# Patient Record
Sex: MALE | Race: OTHER | Employment: UNEMPLOYED | URBAN - METROPOLITAN AREA
[De-identification: names, ages, dates, MRNs, and addresses within clinical notes are randomized per-mention and may not be internally consistent; named-entity substitution may affect disease eponyms.]

---

## 2023-12-24 ENCOUNTER — HOSPITAL ENCOUNTER (INPATIENT)
Age: 43
LOS: 2 days | Discharge: HOME | DRG: 434 | End: 2023-12-27
Attending: EMERGENCY MEDICINE | Admitting: STUDENT IN AN ORGANIZED HEALTH CARE EDUCATION/TRAINING PROGRAM
Payer: COMMERCIAL

## 2023-12-24 ENCOUNTER — APPOINTMENT (OUTPATIENT)
Dept: CT IMAGING | Age: 43
DRG: 434 | End: 2023-12-24
Payer: COMMERCIAL

## 2023-12-24 ENCOUNTER — APPOINTMENT (OUTPATIENT)
Dept: GENERAL RADIOLOGY | Age: 43
DRG: 434 | End: 2023-12-24
Payer: COMMERCIAL

## 2023-12-24 DIAGNOSIS — K76.82 HEPATIC ENCEPHALOPATHY (HCC): ICD-10-CM

## 2023-12-24 DIAGNOSIS — R73.9 HYPERGLYCEMIA: Primary | ICD-10-CM

## 2023-12-24 DIAGNOSIS — R53.1 RIGHT SIDED WEAKNESS: ICD-10-CM

## 2023-12-24 DIAGNOSIS — R79.89 INCREASED AMMONIA LEVEL: ICD-10-CM

## 2023-12-24 DIAGNOSIS — R29.90 STROKE-LIKE SYMPTOMS: ICD-10-CM

## 2023-12-24 DIAGNOSIS — R79.89 TROPONIN LEVEL ELEVATED: ICD-10-CM

## 2023-12-24 LAB
ALBUMIN SERPL-MCNC: 3.2 GM/DL (ref 3.4–5)
ALP BLD-CCNC: 129 IU/L (ref 40–129)
ALT SERPL-CCNC: 21 U/L (ref 10–40)
AMMONIA: 207 UMOL/L (ref 16–60)
ANION GAP SERPL CALCULATED.3IONS-SCNC: 11 MMOL/L (ref 7–16)
APTT: 36.1 SECONDS (ref 25.1–37.1)
AST SERPL-CCNC: 47 IU/L (ref 15–37)
BACTERIA: NEGATIVE /HPF
BASE EXCESS MIXED: 2.3 (ref 0–3)
BASE EXCESS: ABNORMAL (ref 0–3)
BASOPHILS ABSOLUTE: 0.1 K/CU MM
BASOPHILS RELATIVE PERCENT: 1.3 % (ref 0–1)
BILIRUB SERPL-MCNC: 2.4 MG/DL (ref 0–1)
BILIRUBIN URINE: NEGATIVE MG/DL
BLOOD, URINE: ABNORMAL
BUN SERPL-MCNC: 21 MG/DL (ref 6–23)
CALCIUM SERPL-MCNC: 8.9 MG/DL (ref 8.3–10.6)
CAST TYPE: ABNORMAL /HPF
CHLORIDE BLD-SCNC: 97 MMOL/L (ref 99–110)
CHP ED QC CHECK: YES
CLARITY: CLEAR
CO2 CONTENT: 24.5 MMOL/L (ref 21–32)
CO2: 22 MMOL/L (ref 21–32)
COLOR: YELLOW
COMMENT UA: ABNORMAL
CREAT SERPL-MCNC: 1.2 MG/DL (ref 0.9–1.3)
CRYSTAL TYPE: NEGATIVE /HPF
DIFFERENTIAL TYPE: ABNORMAL
EOSINOPHILS ABSOLUTE: 0.1 K/CU MM
EOSINOPHILS RELATIVE PERCENT: 2 % (ref 0–3)
EPITHELIAL CELLS, UA: 2 /HPF
GFR SERPL CREATININE-BSD FRML MDRD: >60 ML/MIN/1.73M2
GLUCOSE BLD-MCNC: 191 MG/DL
GLUCOSE BLD-MCNC: 191 MG/DL (ref 70–99)
GLUCOSE BLD-MCNC: 402 MG/DL (ref 70–99)
GLUCOSE SERPL-MCNC: 529 MG/DL (ref 70–99)
GLUCOSE, URINE: >1000 MG/DL
HCO3 VENOUS: 23.7 MMOL/L (ref 22–29)
HCT VFR BLD CALC: 31.7 % (ref 42–52)
HEMOGLOBIN: 11.2 GM/DL (ref 13.5–18)
IMMATURE NEUTROPHIL %: 0.2 % (ref 0–0.43)
INR BLD: 1.4 INDEX
KETONES, URINE: NEGATIVE MG/DL
LACTATE: 2.1 MMOL/L (ref 0.5–1.9)
LEUKOCYTE ESTERASE, URINE: NEGATIVE
LIPASE: 75 IU/L (ref 13–60)
LYMPHOCYTES ABSOLUTE: 1 K/CU MM
LYMPHOCYTES RELATIVE PERCENT: 21.4 % (ref 24–44)
MCH RBC QN AUTO: 34.4 PG (ref 27–31)
MCHC RBC AUTO-ENTMCNC: 35.3 % (ref 32–36)
MCV RBC AUTO: 97.2 FL (ref 78–100)
MONOCYTES ABSOLUTE: 0.4 K/CU MM
MONOCYTES RELATIVE PERCENT: 9 % (ref 0–4)
NITRITE URINE, QUANTITATIVE: NEGATIVE
O2 SAT, VEN: 99.1 % (ref 50–70)
PCO2, VEN: 26.5 MMHG (ref 41–51)
PDW BLD-RTO: 12.8 % (ref 11.7–14.9)
PH VENOUS: 7.56 (ref 7.32–7.43)
PH, URINE: 6 (ref 5–8)
PLATELET # BLD: 65 K/CU MM (ref 140–440)
PMV BLD AUTO: 11.6 FL (ref 7.5–11.1)
PO2, VEN: 112.1 MMHG (ref 28–48)
POTASSIUM SERPL-SCNC: 4.5 MMOL/L (ref 3.5–5.1)
PRO-BNP: 50.85 PG/ML
PROTEIN UA: NEGATIVE MG/DL
PROTHROMBIN TIME: 18.1 SECONDS (ref 11.7–14.5)
RBC # BLD: 3.26 M/CU MM (ref 4.6–6.2)
RBC URINE: 8 /HPF (ref 0–3)
SEGMENTED NEUTROPHILS ABSOLUTE COUNT: 3 K/CU MM
SEGMENTED NEUTROPHILS RELATIVE PERCENT: 66.1 % (ref 36–66)
SODIUM BLD-SCNC: 130 MMOL/L (ref 135–145)
SOURCE, BLOOD GAS: ABNORMAL
SPECIFIC GRAVITY UA: 1.01 (ref 1–1.03)
TOTAL IMMATURE NEUTOROPHIL: 0.01 K/CU MM
TOTAL PROTEIN: 7.7 GM/DL (ref 6.4–8.2)
TROPONIN, HIGH SENSITIVITY: 48 NG/L (ref 0–22)
UROBILINOGEN, URINE: 1 MG/DL (ref 0.2–1)
WBC # BLD: 4.6 K/CU MM (ref 4–10.5)
WBC UA: <1 /HPF (ref 0–2)

## 2023-12-24 PROCEDURE — 96374 THER/PROPH/DIAG INJ IV PUSH: CPT

## 2023-12-24 PROCEDURE — 2580000003 HC RX 258: Performed by: EMERGENCY MEDICINE

## 2023-12-24 PROCEDURE — 82010 KETONE BODYS QUAN: CPT

## 2023-12-24 PROCEDURE — 85025 COMPLETE CBC W/AUTO DIFF WBC: CPT

## 2023-12-24 PROCEDURE — 83880 ASSAY OF NATRIURETIC PEPTIDE: CPT

## 2023-12-24 PROCEDURE — 83690 ASSAY OF LIPASE: CPT

## 2023-12-24 PROCEDURE — 6370000000 HC RX 637 (ALT 250 FOR IP): Performed by: EMERGENCY MEDICINE

## 2023-12-24 PROCEDURE — 82962 GLUCOSE BLOOD TEST: CPT

## 2023-12-24 PROCEDURE — 85610 PROTHROMBIN TIME: CPT

## 2023-12-24 PROCEDURE — 99285 EMERGENCY DEPT VISIT HI MDM: CPT

## 2023-12-24 PROCEDURE — 71045 X-RAY EXAM CHEST 1 VIEW: CPT

## 2023-12-24 PROCEDURE — 85730 THROMBOPLASTIN TIME PARTIAL: CPT

## 2023-12-24 PROCEDURE — 82140 ASSAY OF AMMONIA: CPT

## 2023-12-24 PROCEDURE — 81001 URINALYSIS AUTO W/SCOPE: CPT

## 2023-12-24 PROCEDURE — 96361 HYDRATE IV INFUSION ADD-ON: CPT

## 2023-12-24 PROCEDURE — 83605 ASSAY OF LACTIC ACID: CPT

## 2023-12-24 PROCEDURE — 80053 COMPREHEN METABOLIC PANEL: CPT

## 2023-12-24 PROCEDURE — 70498 CT ANGIOGRAPHY NECK: CPT

## 2023-12-24 PROCEDURE — 70450 CT HEAD/BRAIN W/O DYE: CPT

## 2023-12-24 PROCEDURE — 84484 ASSAY OF TROPONIN QUANT: CPT

## 2023-12-24 PROCEDURE — 93005 ELECTROCARDIOGRAM TRACING: CPT | Performed by: EMERGENCY MEDICINE

## 2023-12-24 PROCEDURE — 6360000004 HC RX CONTRAST MEDICATION: Performed by: EMERGENCY MEDICINE

## 2023-12-24 RX ORDER — 0.9 % SODIUM CHLORIDE 0.9 %
1000 INTRAVENOUS SOLUTION INTRAVENOUS ONCE
Status: COMPLETED | OUTPATIENT
Start: 2023-12-24 | End: 2023-12-24

## 2023-12-24 RX ORDER — LACTULOSE 10 G/15ML
20 SOLUTION ORAL ONCE
Status: COMPLETED | OUTPATIENT
Start: 2023-12-24 | End: 2023-12-24

## 2023-12-24 RX ADMIN — INSULIN HUMAN 10 UNITS: 100 INJECTION, SOLUTION PARENTERAL at 21:03

## 2023-12-24 RX ADMIN — IOPAMIDOL 100 ML: 755 INJECTION, SOLUTION INTRAVENOUS at 21:04

## 2023-12-24 RX ADMIN — LACTULOSE 20 G: 20 SOLUTION ORAL at 21:04

## 2023-12-24 RX ADMIN — SODIUM CHLORIDE 1000 ML: 9 INJECTION, SOLUTION INTRAVENOUS at 20:23

## 2023-12-24 RX ADMIN — SODIUM CHLORIDE 1000 ML: 9 INJECTION, SOLUTION INTRAVENOUS at 21:29

## 2023-12-25 ENCOUNTER — APPOINTMENT (OUTPATIENT)
Dept: CT IMAGING | Age: 43
DRG: 434 | End: 2023-12-25
Payer: COMMERCIAL

## 2023-12-25 ENCOUNTER — APPOINTMENT (OUTPATIENT)
Dept: MRI IMAGING | Age: 43
DRG: 434 | End: 2023-12-25
Payer: COMMERCIAL

## 2023-12-25 PROBLEM — G93.40 ENCEPHALOPATHY: Status: ACTIVE | Noted: 2023-12-25

## 2023-12-25 LAB
ACETAMINOPHEN LEVEL: <5 UG/ML (ref 15–30)
ALBUMIN SERPL-MCNC: 3.1 GM/DL (ref 3.4–5)
ALCOHOL SCREEN SERUM: <0.01 %WT/VOL
ALP BLD-CCNC: 109 IU/L (ref 40–128)
ALT SERPL-CCNC: 21 U/L (ref 10–40)
AMMONIA: 152 UMOL/L (ref 16–60)
ANION GAP SERPL CALCULATED.3IONS-SCNC: 12 MMOL/L (ref 7–16)
AST SERPL-CCNC: 35 IU/L (ref 15–37)
BASOPHILS ABSOLUTE: 0.1 K/CU MM
BASOPHILS RELATIVE PERCENT: 1.2 % (ref 0–1)
BILIRUB SERPL-MCNC: 2.1 MG/DL (ref 0–1)
BUN SERPL-MCNC: 18 MG/DL (ref 6–23)
CALCIUM SERPL-MCNC: 8.4 MG/DL (ref 8.3–10.6)
CHLORIDE BLD-SCNC: 103 MMOL/L (ref 99–110)
CHOLEST SERPL-MCNC: 126 MG/DL
CHP ED QC CHECK: NORMAL
CO2: 22 MMOL/L (ref 21–32)
CREAT SERPL-MCNC: 1.1 MG/DL (ref 0.9–1.3)
DIFFERENTIAL TYPE: ABNORMAL
EKG ATRIAL RATE: 83 BPM
EKG ATRIAL RATE: 88 BPM
EKG DIAGNOSIS: NORMAL
EKG DIAGNOSIS: NORMAL
EKG P AXIS: 37 DEGREES
EKG P AXIS: 43 DEGREES
EKG P-R INTERVAL: 118 MS
EKG P-R INTERVAL: 144 MS
EKG Q-T INTERVAL: 396 MS
EKG Q-T INTERVAL: 400 MS
EKG QRS DURATION: 94 MS
EKG QRS DURATION: 94 MS
EKG QTC CALCULATION (BAZETT): 465 MS
EKG QTC CALCULATION (BAZETT): 484 MS
EKG R AXIS: 11 DEGREES
EKG R AXIS: 12 DEGREES
EKG T AXIS: 24 DEGREES
EKG T AXIS: 30 DEGREES
EKG VENTRICULAR RATE: 83 BPM
EKG VENTRICULAR RATE: 88 BPM
EOSINOPHILS ABSOLUTE: 0.1 K/CU MM
EOSINOPHILS RELATIVE PERCENT: 2.9 % (ref 0–3)
ESTIMATED AVERAGE GLUCOSE: 128 MG/DL
FERRITIN: 135 NG/ML (ref 30–400)
GFR SERPL CREATININE-BSD FRML MDRD: >60 ML/MIN/1.73M2
GLUCOSE BLD-MCNC: 120 MG/DL (ref 70–99)
GLUCOSE BLD-MCNC: 154 MG/DL
GLUCOSE BLD-MCNC: 154 MG/DL (ref 70–99)
GLUCOSE BLD-MCNC: 160 MG/DL (ref 70–99)
GLUCOSE BLD-MCNC: 311 MG/DL (ref 70–99)
GLUCOSE BLD-MCNC: 381 MG/DL (ref 70–99)
GLUCOSE BLD-MCNC: 389 MG/DL (ref 70–99)
GLUCOSE SERPL-MCNC: 132 MG/DL (ref 70–99)
HAV IGM SERPL QL IA: NON REACTIVE
HBA1C MFR BLD: 6.1 % (ref 4.2–6.3)
HBV CORE IGM SERPL QL IA: NON REACTIVE
HBV SURFACE AG SERPL QL IA: NON REACTIVE
HCT VFR BLD CALC: 29.1 % (ref 42–52)
HCV AB SERPL QL IA: NON REACTIVE
HDLC SERPL-MCNC: 66 MG/DL
HEMOGLOBIN: 10.2 GM/DL (ref 13.5–18)
HIV 1+2 AB+HIV1P24 AG SERPLBLD IA.RAPID: NON REACTIVE
IMMATURE NEUTROPHIL %: 0.5 % (ref 0–0.43)
INR BLD: 1.6 INDEX
IRON: 96 UG/DL (ref 59–158)
LDLC SERPL CALC-MCNC: 44 MG/DL
LYMPHOCYTES ABSOLUTE: 1 K/CU MM
LYMPHOCYTES RELATIVE PERCENT: 24.1 % (ref 24–44)
MCH RBC QN AUTO: 34.8 PG (ref 27–31)
MCHC RBC AUTO-ENTMCNC: 35.1 % (ref 32–36)
MCV RBC AUTO: 99.3 FL (ref 78–100)
MONOCYTES ABSOLUTE: 0.3 K/CU MM
MONOCYTES RELATIVE PERCENT: 7.8 % (ref 0–4)
NUCLEATED RBC %: 0 %
PCT TRANSFERRIN: 54 % (ref 10–44)
PDW BLD-RTO: 12.7 % (ref 11.7–14.9)
PLATELET # BLD: 60 K/CU MM (ref 140–440)
PMV BLD AUTO: 10.6 FL (ref 7.5–11.1)
POTASSIUM SERPL-SCNC: 3.8 MMOL/L (ref 3.5–5.1)
PROTHROMBIN TIME: 18.8 SECONDS (ref 11.7–14.5)
RBC # BLD: 2.93 M/CU MM (ref 4.6–6.2)
SEGMENTED NEUTROPHILS ABSOLUTE COUNT: 2.6 K/CU MM
SEGMENTED NEUTROPHILS RELATIVE PERCENT: 63.5 % (ref 36–66)
SODIUM BLD-SCNC: 137 MMOL/L (ref 135–145)
T4 FREE SERPL-MCNC: 1.1 NG/DL (ref 0.9–1.8)
TOTAL IMMATURE NEUTOROPHIL: 0.02 K/CU MM
TOTAL IRON BINDING CAPACITY: 178 UG/DL (ref 250–450)
TOTAL NUCLEATED RBC: 0 K/CU MM
TOTAL PROTEIN: 6.6 GM/DL (ref 6.4–8.2)
TRIGL SERPL-MCNC: 82 MG/DL
TROPONIN, HIGH SENSITIVITY: 57 NG/L (ref 0–22)
TSH SERPL DL<=0.005 MIU/L-ACNC: 3.1 UIU/ML (ref 0.27–4.2)
UNSATURATED IRON BINDING CAPACITY: 82 UG/DL (ref 110–370)
WBC # BLD: 4.1 K/CU MM (ref 4–10.5)

## 2023-12-25 PROCEDURE — 93005 ELECTROCARDIOGRAM TRACING: CPT | Performed by: STUDENT IN AN ORGANIZED HEALTH CARE EDUCATION/TRAINING PROGRAM

## 2023-12-25 PROCEDURE — 2580000003 HC RX 258: Performed by: STUDENT IN AN ORGANIZED HEALTH CARE EDUCATION/TRAINING PROGRAM

## 2023-12-25 PROCEDURE — 83540 ASSAY OF IRON: CPT

## 2023-12-25 PROCEDURE — 82390 ASSAY OF CERULOPLASMIN: CPT

## 2023-12-25 PROCEDURE — 82140 ASSAY OF AMMONIA: CPT

## 2023-12-25 PROCEDURE — G0480 DRUG TEST DEF 1-7 CLASSES: HCPCS

## 2023-12-25 PROCEDURE — 6370000000 HC RX 637 (ALT 250 FOR IP): Performed by: STUDENT IN AN ORGANIZED HEALTH CARE EDUCATION/TRAINING PROGRAM

## 2023-12-25 PROCEDURE — G0378 HOSPITAL OBSERVATION PER HR: HCPCS

## 2023-12-25 PROCEDURE — 74177 CT ABD & PELVIS W/CONTRAST: CPT

## 2023-12-25 PROCEDURE — 84439 ASSAY OF FREE THYROXINE: CPT

## 2023-12-25 PROCEDURE — 83550 IRON BINDING TEST: CPT

## 2023-12-25 PROCEDURE — 85025 COMPLETE CBC W/AUTO DIFF WBC: CPT

## 2023-12-25 PROCEDURE — 97535 SELF CARE MNGMENT TRAINING: CPT

## 2023-12-25 PROCEDURE — 82728 ASSAY OF FERRITIN: CPT

## 2023-12-25 PROCEDURE — 6360000004 HC RX CONTRAST MEDICATION: Performed by: STUDENT IN AN ORGANIZED HEALTH CARE EDUCATION/TRAINING PROGRAM

## 2023-12-25 PROCEDURE — 83036 HEMOGLOBIN GLYCOSYLATED A1C: CPT

## 2023-12-25 PROCEDURE — 80053 COMPREHEN METABOLIC PANEL: CPT

## 2023-12-25 PROCEDURE — 85610 PROTHROMBIN TIME: CPT

## 2023-12-25 PROCEDURE — 97116 GAIT TRAINING THERAPY: CPT

## 2023-12-25 PROCEDURE — 70551 MRI BRAIN STEM W/O DYE: CPT

## 2023-12-25 PROCEDURE — 82103 ALPHA-1-ANTITRYPSIN TOTAL: CPT

## 2023-12-25 PROCEDURE — 93010 ELECTROCARDIOGRAM REPORT: CPT | Performed by: INTERNAL MEDICINE

## 2023-12-25 PROCEDURE — 80074 ACUTE HEPATITIS PANEL: CPT

## 2023-12-25 PROCEDURE — 84443 ASSAY THYROID STIM HORMONE: CPT

## 2023-12-25 PROCEDURE — 80061 LIPID PANEL: CPT

## 2023-12-25 PROCEDURE — 84484 ASSAY OF TROPONIN QUANT: CPT

## 2023-12-25 PROCEDURE — 86038 ANTINUCLEAR ANTIBODIES: CPT

## 2023-12-25 PROCEDURE — 36415 COLL VENOUS BLD VENIPUNCTURE: CPT

## 2023-12-25 PROCEDURE — 86039 ANTINUCLEAR ANTIBODIES (ANA): CPT

## 2023-12-25 PROCEDURE — 82962 GLUCOSE BLOOD TEST: CPT

## 2023-12-25 PROCEDURE — 97161 PT EVAL LOW COMPLEX 20 MIN: CPT

## 2023-12-25 PROCEDURE — 87389 HIV-1 AG W/HIV-1&-2 AB AG IA: CPT

## 2023-12-25 PROCEDURE — 1200000000 HC SEMI PRIVATE

## 2023-12-25 PROCEDURE — 97165 OT EVAL LOW COMPLEX 30 MIN: CPT

## 2023-12-25 PROCEDURE — 94761 N-INVAS EAR/PLS OXIMETRY MLT: CPT

## 2023-12-25 RX ORDER — HEPARIN SODIUM 5000 [USP'U]/ML
5000 INJECTION, SOLUTION INTRAVENOUS; SUBCUTANEOUS EVERY 8 HOURS SCHEDULED
Status: DISCONTINUED | OUTPATIENT
Start: 2023-12-25 | End: 2023-12-27 | Stop reason: HOSPADM

## 2023-12-25 RX ORDER — INSULIN GLARGINE 100 [IU]/ML
5 INJECTION, SOLUTION SUBCUTANEOUS NIGHTLY
Status: DISCONTINUED | OUTPATIENT
Start: 2023-12-25 | End: 2023-12-25

## 2023-12-25 RX ORDER — ONDANSETRON 2 MG/ML
4 INJECTION INTRAMUSCULAR; INTRAVENOUS EVERY 6 HOURS PRN
Status: DISCONTINUED | OUTPATIENT
Start: 2023-12-25 | End: 2023-12-25

## 2023-12-25 RX ORDER — ENOXAPARIN SODIUM 100 MG/ML
40 INJECTION SUBCUTANEOUS DAILY
Status: DISCONTINUED | OUTPATIENT
Start: 2023-12-25 | End: 2023-12-25

## 2023-12-25 RX ORDER — INSULIN LISPRO 100 [IU]/ML
0-8 INJECTION, SOLUTION INTRAVENOUS; SUBCUTANEOUS
Status: DISCONTINUED | OUTPATIENT
Start: 2023-12-25 | End: 2023-12-26

## 2023-12-25 RX ORDER — GLUCAGON 1 MG/ML
1 KIT INJECTION PRN
Status: DISCONTINUED | OUTPATIENT
Start: 2023-12-25 | End: 2023-12-27 | Stop reason: HOSPADM

## 2023-12-25 RX ORDER — DEXTROSE MONOHYDRATE 100 MG/ML
INJECTION, SOLUTION INTRAVENOUS CONTINUOUS PRN
Status: DISCONTINUED | OUTPATIENT
Start: 2023-12-25 | End: 2023-12-27 | Stop reason: HOSPADM

## 2023-12-25 RX ORDER — SODIUM CHLORIDE 0.9 % (FLUSH) 0.9 %
5-40 SYRINGE (ML) INJECTION PRN
Status: DISCONTINUED | OUTPATIENT
Start: 2023-12-25 | End: 2023-12-27 | Stop reason: HOSPADM

## 2023-12-25 RX ORDER — SODIUM CHLORIDE 9 MG/ML
INJECTION, SOLUTION INTRAVENOUS PRN
Status: DISCONTINUED | OUTPATIENT
Start: 2023-12-25 | End: 2023-12-27 | Stop reason: HOSPADM

## 2023-12-25 RX ORDER — INSULIN LISPRO 100 [IU]/ML
0-4 INJECTION, SOLUTION INTRAVENOUS; SUBCUTANEOUS NIGHTLY
Status: DISCONTINUED | OUTPATIENT
Start: 2023-12-25 | End: 2023-12-26

## 2023-12-25 RX ORDER — ASPIRIN 81 MG/1
81 TABLET, CHEWABLE ORAL DAILY
Status: DISCONTINUED | OUTPATIENT
Start: 2023-12-25 | End: 2023-12-27 | Stop reason: HOSPADM

## 2023-12-25 RX ORDER — LACTULOSE 20 G/30ML
20 SOLUTION ORAL 3 TIMES DAILY
Status: DISCONTINUED | OUTPATIENT
Start: 2023-12-25 | End: 2023-12-26 | Stop reason: SDUPTHER

## 2023-12-25 RX ORDER — SODIUM CHLORIDE 0.9 % (FLUSH) 0.9 %
5-40 SYRINGE (ML) INJECTION EVERY 12 HOURS SCHEDULED
Status: DISCONTINUED | OUTPATIENT
Start: 2023-12-25 | End: 2023-12-27 | Stop reason: HOSPADM

## 2023-12-25 RX ORDER — ONDANSETRON 4 MG/1
4 TABLET, ORALLY DISINTEGRATING ORAL EVERY 8 HOURS PRN
Status: DISCONTINUED | OUTPATIENT
Start: 2023-12-25 | End: 2023-12-25

## 2023-12-25 RX ADMIN — IOPAMIDOL 80 ML: 755 INJECTION, SOLUTION INTRAVENOUS at 09:59

## 2023-12-25 RX ADMIN — LACTULOSE 20 G: 20 SOLUTION ORAL at 08:35

## 2023-12-25 RX ADMIN — ASPIRIN 81 MG: 81 TABLET, CHEWABLE ORAL at 08:35

## 2023-12-25 RX ADMIN — RIFAXIMIN 400 MG: 200 TABLET ORAL at 05:03

## 2023-12-25 RX ADMIN — SODIUM CHLORIDE, PRESERVATIVE FREE 10 ML: 5 INJECTION INTRAVENOUS at 08:36

## 2023-12-25 RX ADMIN — LACTULOSE 20 G: 20 SOLUTION ORAL at 12:46

## 2023-12-25 RX ADMIN — RIFAXIMIN 400 MG: 200 TABLET ORAL at 22:36

## 2023-12-25 RX ADMIN — LACTULOSE 20 G: 20 SOLUTION ORAL at 05:03

## 2023-12-25 RX ADMIN — RIFAXIMIN 400 MG: 200 TABLET ORAL at 12:46

## 2023-12-25 RX ADMIN — RIFAXIMIN 400 MG: 200 TABLET ORAL at 08:35

## 2023-12-25 RX ADMIN — LACTULOSE 20 G: 20 SOLUTION ORAL at 21:09

## 2023-12-25 RX ADMIN — SODIUM CHLORIDE, PRESERVATIVE FREE 10 ML: 5 INJECTION INTRAVENOUS at 21:08

## 2023-12-25 RX ADMIN — INSULIN LISPRO 4 UNITS: 100 INJECTION, SOLUTION INTRAVENOUS; SUBCUTANEOUS at 21:09

## 2023-12-25 RX ADMIN — INSULIN LISPRO 8 UNITS: 100 INJECTION, SOLUTION INTRAVENOUS; SUBCUTANEOUS at 17:12

## 2023-12-25 RX ADMIN — INSULIN LISPRO 6 UNITS: 100 INJECTION, SOLUTION INTRAVENOUS; SUBCUTANEOUS at 12:46

## 2023-12-25 NOTE — H&P
History and Physical      Name:  Sharri Gonzalez /Age/Sex: 1980  (37 y.o. male)   MRN & CSN:  [id number] & [id number] Encounter Date/Time: 2023 3:17 AM EST   Location:  [address] [place] [address] PCP: No primary care provider on file. Assessment and Plan:   Sharri Gonzalez is a 37 y.o. male with T2DM, prior CVA?, Liver cirrhosis presented as a transfer from [facility] ER due to R. UE weakness and confusion. Acute Hepatic Encephalopathy   Decompensated Liver Cirrhosis, unspecified etiology  Grade 2 HPE  Reportedly diagnosed in May, 2023. Denies alcohol use/tylenol/hepatitis   MELD score 14  Ammonia 207>152  Lactulose and rifaximin  CT abdomen and pelvis with IV contrast  Cirrhosis workup including: Hepatitis panel ethanol Tylenol level DARYL ceruloplasmin alpha 1 antitrypsin HIV screen. Acute on chronic RLE weakness  Rule out acute CVA  History of L. Frontal craniectomy but unable to tell me specifics  LKN 2023, NIH 10 on arrival.  Stroke alert initiated in ER, not a candidate for TNK due to timeline and likely metabolic etiology. CT brain with no acute intracranial findings, large left craniectomy and encephalomalacia in left frontal lobe. CTA head and neck without any significant LVO.   Start aspirin  Holding Lipitor due to mild elevation in LFTs  NIHSS and neurochecks   MRI brain for further evaluation and management  PT OT    T2DM with hyperglycemia  Apparently on insulin at home, unable to tell me specific dosage etc  Medium dose insulin sliding scale  Hypoglycemia protocol  Check A1c    Elevated troponin, non-ACS trend  EKG nonischemic and chest pain-free  48> 57  Recheck EKG and troponin  Aspirin    Chronic anemia  Thrombocytopenia  Likely secondary to liver cirrhosis  No evidence of active bleeding  Monitor CBC    Obesity/BMI 32.72    Inpatient, MedSur  Full code    Disposition:   Current Living situation: Home  Expected Disposition: Home  Estimated D/C: 2-3 days    Diet ADULT DIET; Easy to I3 Precision

## 2023-12-25 NOTE — CONSULTS
[facility], 1980, 1107/1107-A, 12/25/2023    History  [place]:  The primary encounter diagnosis was Hyperglycemia. Diagnoses of Increased ammonia level, Right sided weakness, Stroke-like symptoms, Troponin level elevated, and Hepatic encephalopathy ([address]) were also pertinent to this visit. Patient  has a past medical history of Cirrhosis of liver ([address]) and Diabetes mellitus ([address]). Patient  has no past surgical history on file. Discharge Recommendation: home with assistance and OPPT    Utilization of Interpreting services: [employer]  ID #:[id number]    Subjective:    Patient states: \"I can walk pretty good. \"      Pain:  denies. Communication with other providers:  Handoff to RN, co-eval with OT    Restrictions: General Precautions, Fall Risk    Home Setup/Prior level of function  Social/Functional History  Lives With: Spouse  Type of Home: House  Home Layout: One level  Home Access: Level entry, Stairs to enter without rails  Entrance Stairs - Number of Steps: 3  Bathroom Shower/Tub: Walk-in shower  Bathroom Toilet: Standard  Has the patient had two or more falls in the past year or any fall with injury in the past year?: Yes  ADL Assistance: Independent  Homemaking Assistance: Independent  Homemaking Responsibilities: No (reports being able to perform but is just \"lazy\" per brother report)  Ambulation Assistance: Independent  Transfer Assistance: Independent  Active : Yes    Examination of body systems (includes body structures/functions, activity/participation limitations):  Observation:  supine in bed with brother present upon arrival and agreeable to therapy  Vision:  [facility]  Hearing:  [place]  Cardiopulmonary:  on room air  Cognition: WFL, see OT/SLP note for further evaluation. Musculoskeletal  ROM R/L:  WFL. Strength R/L:  gross 4+/5 in LLE, gross 4-/5 in RLE, minimal impairment in function and endurance.       Mobility:  Rolling L/R: Please notify pt of that biopsy was c/w pap and is only low grade changes.  Needs pap in 1 year

## 2023-12-25 NOTE — ED PROVIDER NOTES
head and neck is negative. Sugars coming down with IV fluids and insulin. CLINICAL IMPRESSION:  Final diagnoses:   Hyperglycemia   Increased ammonia level   Right sided weakness   Stroke-like symptoms   Troponin level elevated   Hepatic encephalopathy (HCC)       (Please note that portions of this note may have been completed with a voice recognition program. Efforts were made to edit the dictations but occasionally words aremis-transcribed.)    DISPOSITION REFERRAL (if applicable):  No follow-up provider specified.     DISPOSITION MEDICATIONS (if applicable):  New Prescriptions    No medications on file          Roger Isaac, Yariel Paula  12/25/23 0030

## 2023-12-26 LAB
GLUCOSE BLD-MCNC: 209 MG/DL (ref 70–99)
GLUCOSE BLD-MCNC: 220 MG/DL (ref 70–99)
GLUCOSE BLD-MCNC: 261 MG/DL (ref 70–99)
GLUCOSE BLD-MCNC: 283 MG/DL (ref 70–99)
GLUCOSE BLD-MCNC: 286 MG/DL (ref 70–99)
GLUCOSE BLD-MCNC: 365 MG/DL (ref 70–99)
GLUCOSE BLD-MCNC: 389 MG/DL (ref 70–99)
GLUCOSE BLD-MCNC: 426 MG/DL (ref 70–99)
GLUCOSE BLD-MCNC: 435 MG/DL (ref 70–99)

## 2023-12-26 PROCEDURE — 6370000000 HC RX 637 (ALT 250 FOR IP): Performed by: INTERNAL MEDICINE

## 2023-12-26 PROCEDURE — 82962 GLUCOSE BLOOD TEST: CPT

## 2023-12-26 PROCEDURE — 6370000000 HC RX 637 (ALT 250 FOR IP): Performed by: STUDENT IN AN ORGANIZED HEALTH CARE EDUCATION/TRAINING PROGRAM

## 2023-12-26 PROCEDURE — 2580000003 HC RX 258: Performed by: STUDENT IN AN ORGANIZED HEALTH CARE EDUCATION/TRAINING PROGRAM

## 2023-12-26 PROCEDURE — 94761 N-INVAS EAR/PLS OXIMETRY MLT: CPT

## 2023-12-26 PROCEDURE — 51798 US URINE CAPACITY MEASURE: CPT

## 2023-12-26 PROCEDURE — 1200000000 HC SEMI PRIVATE

## 2023-12-26 RX ORDER — INSULIN LISPRO 100 [IU]/ML
0-16 INJECTION, SOLUTION INTRAVENOUS; SUBCUTANEOUS
Status: DISCONTINUED | OUTPATIENT
Start: 2023-12-26 | End: 2023-12-26

## 2023-12-26 RX ORDER — INSULIN LISPRO 100 [IU]/ML
0-8 INJECTION, SOLUTION INTRAVENOUS; SUBCUTANEOUS
Status: DISCONTINUED | OUTPATIENT
Start: 2023-12-26 | End: 2023-12-27 | Stop reason: HOSPADM

## 2023-12-26 RX ORDER — INSULIN LISPRO 100 [IU]/ML
15 INJECTION, SOLUTION INTRAVENOUS; SUBCUTANEOUS
Status: DISCONTINUED | OUTPATIENT
Start: 2023-12-26 | End: 2023-12-27 | Stop reason: HOSPADM

## 2023-12-26 RX ORDER — INSULIN LISPRO 100 [IU]/ML
5 INJECTION, SOLUTION INTRAVENOUS; SUBCUTANEOUS ONCE
Status: DISCONTINUED | OUTPATIENT
Start: 2023-12-26 | End: 2023-12-26

## 2023-12-26 RX ORDER — INSULIN GLARGINE 100 [IU]/ML
30 INJECTION, SOLUTION SUBCUTANEOUS NIGHTLY
Status: DISCONTINUED | OUTPATIENT
Start: 2023-12-26 | End: 2023-12-27

## 2023-12-26 RX ORDER — LACTULOSE 10 G/15ML
20 SOLUTION ORAL ONCE
Status: COMPLETED | OUTPATIENT
Start: 2023-12-26 | End: 2023-12-26

## 2023-12-26 RX ORDER — LACTULOSE 10 G/15ML
20 SOLUTION ORAL 3 TIMES DAILY
Status: DISCONTINUED | OUTPATIENT
Start: 2023-12-26 | End: 2023-12-27 | Stop reason: HOSPADM

## 2023-12-26 RX ORDER — INSULIN LISPRO 100 [IU]/ML
0-8 INJECTION, SOLUTION INTRAVENOUS; SUBCUTANEOUS
Status: DISCONTINUED | OUTPATIENT
Start: 2023-12-26 | End: 2023-12-26

## 2023-12-26 RX ADMIN — INSULIN LISPRO 4 UNITS: 100 INJECTION, SOLUTION INTRAVENOUS; SUBCUTANEOUS at 08:37

## 2023-12-26 RX ADMIN — INSULIN LISPRO 2 UNITS: 100 INJECTION, SOLUTION INTRAVENOUS; SUBCUTANEOUS at 16:18

## 2023-12-26 RX ADMIN — LACTULOSE 20 G: 20 SOLUTION ORAL at 19:58

## 2023-12-26 RX ADMIN — INSULIN LISPRO 2 UNITS: 100 INJECTION, SOLUTION INTRAVENOUS; SUBCUTANEOUS at 22:13

## 2023-12-26 RX ADMIN — LACTULOSE 20 G: 20 SOLUTION ORAL at 22:13

## 2023-12-26 RX ADMIN — RIFAXIMIN 400 MG: 200 TABLET ORAL at 08:37

## 2023-12-26 RX ADMIN — INSULIN GLARGINE 30 UNITS: 100 INJECTION, SOLUTION SUBCUTANEOUS at 22:12

## 2023-12-26 RX ADMIN — RIFAXIMIN 400 MG: 200 TABLET ORAL at 22:13

## 2023-12-26 RX ADMIN — INSULIN LISPRO 15 UNITS: 100 INJECTION, SOLUTION INTRAVENOUS; SUBCUTANEOUS at 12:28

## 2023-12-26 RX ADMIN — LACTULOSE 20 G: 20 SOLUTION ORAL at 14:20

## 2023-12-26 RX ADMIN — SODIUM CHLORIDE, PRESERVATIVE FREE 10 ML: 5 INJECTION INTRAVENOUS at 22:13

## 2023-12-26 RX ADMIN — INSULIN HUMAN 10 UNITS: 100 INJECTION, SUSPENSION SUBCUTANEOUS at 11:12

## 2023-12-26 RX ADMIN — RIFAXIMIN 400 MG: 200 TABLET ORAL at 14:20

## 2023-12-26 RX ADMIN — SODIUM CHLORIDE, PRESERVATIVE FREE 10 ML: 5 INJECTION INTRAVENOUS at 08:39

## 2023-12-26 RX ADMIN — LACTULOSE 20 G: 20 SOLUTION ORAL at 08:37

## 2023-12-26 RX ADMIN — INSULIN LISPRO 15 UNITS: 100 INJECTION, SOLUTION INTRAVENOUS; SUBCUTANEOUS at 16:18

## 2023-12-26 RX ADMIN — INSULIN LISPRO 8 UNITS: 100 INJECTION, SOLUTION INTRAVENOUS; SUBCUTANEOUS at 12:29

## 2023-12-26 RX ADMIN — ASPIRIN 81 MG: 81 TABLET, CHEWABLE ORAL at 08:37

## 2023-12-27 VITALS
TEMPERATURE: 97.6 F | HEART RATE: 104 BPM | OXYGEN SATURATION: 94 % | HEIGHT: 61 IN | DIASTOLIC BLOOD PRESSURE: 70 MMHG | SYSTOLIC BLOOD PRESSURE: 123 MMHG | BODY MASS INDEX: 32.72 KG/M2 | WEIGHT: 173.28 LBS | RESPIRATION RATE: 17 BRPM

## 2023-12-27 LAB
A1AT SERPL-MCNC: 120 MG/DL (ref 90–200)
CERULOPLASMIN SERPL-MCNC: 18 MG/DL (ref 15–30)
GLUCOSE BLD-MCNC: 247 MG/DL (ref 70–99)
GLUCOSE BLD-MCNC: 256 MG/DL (ref 70–99)
GLUCOSE BLD-MCNC: 327 MG/DL (ref 70–99)
GLUCOSE BLD-MCNC: 336 MG/DL (ref 70–99)

## 2023-12-27 PROCEDURE — 2580000003 HC RX 258: Performed by: STUDENT IN AN ORGANIZED HEALTH CARE EDUCATION/TRAINING PROGRAM

## 2023-12-27 PROCEDURE — 82962 GLUCOSE BLOOD TEST: CPT

## 2023-12-27 PROCEDURE — 6370000000 HC RX 637 (ALT 250 FOR IP): Performed by: STUDENT IN AN ORGANIZED HEALTH CARE EDUCATION/TRAINING PROGRAM

## 2023-12-27 PROCEDURE — 94761 N-INVAS EAR/PLS OXIMETRY MLT: CPT

## 2023-12-27 PROCEDURE — 6370000000 HC RX 637 (ALT 250 FOR IP): Performed by: INTERNAL MEDICINE

## 2023-12-27 RX ORDER — INSULIN LISPRO 100 [IU]/ML
15 INJECTION, SOLUTION INTRAVENOUS; SUBCUTANEOUS
Qty: 10 ML | Refills: 0 | Status: SHIPPED | OUTPATIENT
Start: 2023-12-27

## 2023-12-27 RX ORDER — BLOOD-GLUCOSE METER
1 KIT MISCELLANEOUS DAILY
Qty: 1 KIT | Refills: 0 | Status: SHIPPED | OUTPATIENT
Start: 2023-12-27

## 2023-12-27 RX ORDER — INSULIN LISPRO 100 [IU]/ML
0-8 INJECTION, SOLUTION INTRAVENOUS; SUBCUTANEOUS
Qty: 10 ML | Refills: 0 | Status: SHIPPED | OUTPATIENT
Start: 2023-12-27

## 2023-12-27 RX ORDER — ASPIRIN 81 MG/1
81 TABLET, CHEWABLE ORAL DAILY
Qty: 30 TABLET | Refills: 3 | Status: SHIPPED | OUTPATIENT
Start: 2023-12-28

## 2023-12-27 RX ORDER — INSULIN GLARGINE 100 [IU]/ML
40 INJECTION, SOLUTION SUBCUTANEOUS NIGHTLY
Qty: 10 ML | Refills: 3 | Status: SHIPPED | OUTPATIENT
Start: 2023-12-27

## 2023-12-27 RX ORDER — INSULIN GLARGINE 100 [IU]/ML
40 INJECTION, SOLUTION SUBCUTANEOUS NIGHTLY
Status: DISCONTINUED | OUTPATIENT
Start: 2023-12-27 | End: 2023-12-27 | Stop reason: HOSPADM

## 2023-12-27 RX ORDER — LACTULOSE 10 G/15ML
20 SOLUTION ORAL 3 TIMES DAILY
Qty: 946 ML | Refills: 1 | Status: SHIPPED | OUTPATIENT
Start: 2023-12-27

## 2023-12-27 RX ADMIN — INSULIN LISPRO 4 UNITS: 100 INJECTION, SOLUTION INTRAVENOUS; SUBCUTANEOUS at 08:47

## 2023-12-27 RX ADMIN — INSULIN LISPRO 6 UNITS: 100 INJECTION, SOLUTION INTRAVENOUS; SUBCUTANEOUS at 11:29

## 2023-12-27 RX ADMIN — LACTULOSE 20 G: 20 SOLUTION ORAL at 08:46

## 2023-12-27 RX ADMIN — INSULIN LISPRO 2 UNITS: 100 INJECTION, SOLUTION INTRAVENOUS; SUBCUTANEOUS at 04:16

## 2023-12-27 RX ADMIN — RIFAXIMIN 400 MG: 200 TABLET ORAL at 08:47

## 2023-12-27 RX ADMIN — INSULIN LISPRO 15 UNITS: 100 INJECTION, SOLUTION INTRAVENOUS; SUBCUTANEOUS at 11:29

## 2023-12-27 RX ADMIN — INSULIN LISPRO 15 UNITS: 100 INJECTION, SOLUTION INTRAVENOUS; SUBCUTANEOUS at 17:05

## 2023-12-27 RX ADMIN — ASPIRIN 81 MG: 81 TABLET, CHEWABLE ORAL at 08:47

## 2023-12-27 RX ADMIN — INSULIN LISPRO 6 UNITS: 100 INJECTION, SOLUTION INTRAVENOUS; SUBCUTANEOUS at 17:04

## 2023-12-27 RX ADMIN — LACTULOSE 20 G: 20 SOLUTION ORAL at 14:45

## 2023-12-27 RX ADMIN — INSULIN LISPRO 15 UNITS: 100 INJECTION, SOLUTION INTRAVENOUS; SUBCUTANEOUS at 08:48

## 2023-12-27 RX ADMIN — SODIUM CHLORIDE, PRESERVATIVE FREE 10 ML: 5 INJECTION INTRAVENOUS at 08:47

## 2023-12-27 RX ADMIN — RIFAXIMIN 400 MG: 200 TABLET ORAL at 14:45

## 2023-12-27 NOTE — DISCHARGE SUMMARY
weight based adjustment of the mA/kV was utilized to reduce the radiation dose to as low as reasonably achievable. COMPARISON: None. HISTORY: Reason for Exam: Right side weakness FINDINGS: CTA NECK: AORTIC ARCH/ARCH VESSELS: No dissection or arterial injury. No significant stenosis of the brachiocephalic or subclavian arteries. CAROTID ARTERIES: No dissection, arterial injury, or hemodynamically significant stenosis by NASCET criteria. VERTEBRAL ARTERIES: No dissection, arterial injury, or significant stenosis. SOFT TISSUES: The lung apices are clear. No cervical or superior mediastinal lymphadenopathy. The larynx and pharynx are unremarkable. No acute abnormality of the salivary and thyroid glands. BONES: No acute osseous abnormality. CTA HEAD: ANTERIOR CIRCULATION: No significant stenosis of the intracranial internal carotid, anterior cerebral, or middle cerebral arteries. No aneurysm. POSTERIOR CIRCULATION: No significant stenosis of the vertebral, basilar, or posterior cerebral arteries. No aneurysm. OTHER: No dural venous sinus thrombosis on this non-dedicated study. BRAIN: No mass effect or midline shift. No extra-axial fluid collection. The gray-white differentiation is maintained. No flow-limiting large vessel stenosis in the head and neck. CT HEAD WO CONTRAST    Addendum Date: 12/24/2023    ADDENDUM: Findings were communicated to Dr. Johnny Mckeon by the CORE team on 12/24/2023 at 9:23 pm.     Result Date: 12/24/2023  EXAMINATION: CT OF THE HEAD WITHOUT CONTRAST  12/24/2023 7:57 pm TECHNIQUE: CT of the head was performed without the administration of intravenous contrast. Automated exposure control, iterative reconstruction, and/or weight based adjustment of the mA/kV was utilized to reduce the radiation dose to as low as reasonably achievable. COMPARISON: None.  HISTORY: ORDERING SYSTEM PROVIDED HISTORY: HEAD TRAUMA, CLOSED, MILD, GCS >= 13, NO RISK FACTORS, NEURO EXAM NORMAL TECHNOLOGIST PROVIDED

## 2023-12-27 NOTE — CARE COORDINATION
From home with wife, and two adult children. ind, has pcp, dr Terrence Mason, insurance, and wife denies needs. Will follow. [facility]   12/27/23 4723   Service Assessment   Patient Orientation Unable to Assess   Cognition Other (see comment)   History Provided By [facility]   Primary Caregiver Self   Support Systems Spouse/Significant Other   Patient's Healthcare Decision Maker is: Legal Next of Kin   PCP Verified by CM Yes   Last Visit to PCP Within last 3 months   Prior Functional Level Independent in ADLs/IADLs   Current Functional Level Independent in ADLs/IADLs   Can patient return to prior living arrangement Yes   Ability to make needs known: Good   Family able to assist with home care needs: No   Would you like for me to discuss the discharge plan with any other family members/significant others, and if so, who? Yes   Community Resources None   Social/Functional History   Lives With Spouse   Type of [address] [place] [address] Center  One level   Home Access Level entry;Stairs to enter without rails   Bathroom Shower/Tub Walk-in shower   [facility] Responsibilities No   Ambulation Assistance Independent   Transfer Assistance Independent   Active  Yes   Discharge [address] Prior To Admission None   Potential Assistance Needed N/A   DME Ordered? No   Potential Assistance Purchasing Medications No   Type of Home Care Services None   Services At/After Discharge   Transition of Care Consult (CM Consult) [facility] Discharge None   [place] Resource Information Provided?  No
<-- Click to add NO pertinent Past Medical History

## 2023-12-28 LAB — NUCLEAR IGG SER QL IA: DETECTED

## 2023-12-28 NOTE — PROGRESS NOTES
4 Eyes Skin Assessment     NAME:  Teo Pang  YOB: 1980  MEDICAL RECORD NUMBER:  [id number]    The patient is being assessed for  Admission    I agree that at least one RN has performed a thorough Head to Toe Skin Assessment on the patient. ALL assessment sites listed below have been assessed. Areas assessed by both nurses:    Head, Face, Ears, Shoulders, Back, Chest, Arms, Elbows, Hands, Sacrum. Buttock, Coccyx, Ischium, Legs. Feet and Heels, and Under Medical Devices         Does the Patient have a Wound?  No noted wound(s)       Lion Prevention initiated by RN: No  Wound Care Orders initiated by RN: No    Pressure Injury (Stage 3,4, Unstageable, DTI, NWPT, and Complex wounds) if present, place Wound referral order by RN under : No    New Ostomies, if present place, Ostomy referral order under : No     Nurse 1 eSignature: Electronically signed by Mooes Baker RN on 12/25/23 at 2:20 AM EST    **SHARE this note so that the co-signing nurse can place an eSignature**    Nurse 2 eSignature: Electronically signed by Benita Harris LPN on 51/38/15 at 6:19 AM EST
[address] [place] [address] ACUTE CARE OCCUPATIONAL THERAPY EVALUATION  Martin Sheikh, 1980, 1107/1107-A, 12/25/2023    History  [place]:  The primary encounter diagnosis was Hyperglycemia. Diagnoses of Increased ammonia level, Right sided weakness, Stroke-like symptoms, Troponin level elevated, and Hepatic encephalopathy ([address]) were also pertinent to this visit. Patient  has a past medical history of Cirrhosis of liver ([address]) and Diabetes mellitus ([address]). Patient  has no past surgical history on file. Subjective:  Patient states:  Pt Swedish speaking. Requiring IPAD . Pain:  No.    Communication with other providers:  Handoff to RN, co-alberto w/ PT  Restrictions: General Precautions, Fall Risk    Home Setup/Prior level of function  Social/Functional History  Lives With: Spouse  Type of Home: House  Home Layout: One level  Home Access: Level entry, Stairs to enter without rails  Entrance Stairs - Number of Steps: 3  Bathroom Shower/Tub: Walk-in shower  Bathroom Toilet: Standard  Has the patient had two or more falls in the past year or any fall with injury in the past year?: Yes  ADL Assistance: [id number] Independent  Homemaking Responsibilities: No (reports being able to perform but is just \"lazy\" per brother report)  Ambulation Assistance: Independent  Transfer Assistance: Independent  Active : Yes    Examination of body systems (includes body structures/functions, activity/participation limitations):  Observation:  Supine in bed upon arrival, agreeable to therapy   Vision:  WFL  Hearing:  [facility]  Cardiopulmonary:  No 02 needs      Body Systems and functions:  ROM R/L:  LUE:  WFL.  RUE: ~110 degrees shoulder flexion, elbow flexion WFL w/ increased time and effort, finger flexion/extension decreased ROM  Strength R/L:  LUE: 5/5, RUE: 3/5   Sensation: Denies numbness in RUE  Tone: LUE: WFL; RUE: Hypotonic  Coordination: Decreased gross/fine motor
AVS went over with patient. Patient eager to discharge, no additional concerns.
During pt assessment this nurse began to set up interpretor for Pashto speaking pt. Pt's brother in the room and pt told brother he does not wish to speak to the interpretor because he is not understanding Pashto at this time and will not be able to understand a word the interpretor is saying. The pt is experiencing expressive aphasia and is only speaking in very small [place] phrases. Pt's brother speaks [place] as well and is at bedside with patient.
Notified Dr. Kim Jefferson that patient seems to becoming more confused. He tried to get out of bed and said he was leaving because he feels like he is dying. Blood sugar rechecked and the result is 261. Dr. Kim Jefferson also notified the patient stated he has not urinated today or had a bowel movement. Bladder scan completed and the result was 0.
Outpatient Pharmacy Progress Note for Meds-to-Beds    Total number of Prescriptions Filled: 2  The following medications were dispensed to the patient during the discharge process:  Aspirin  Novolog    Additional Documentation:  Medication(s) were delivered to the patient's room prior to discharge    Other: Xifaxan, lactulose and diabetic testing supplies were not covered. Transferred scripts to [facility] in [facility]. Thank you for letting us serve your patients.   [facility] [address], [address] [place] [address]    Phone: [phone]    Fax: [phone]
Patient is quite and asked numerous times today if he could go home, I told him not for a few days due to his labs being off and the need to keep track of what is going on.
Patient seen and examined at bedside this morning. Admitted earlier today by Dr. Juliette Whittington for acute hepatic encephalopathy secondary to decompensated liver cirrhosis and CVA rule out. Pending MRI.   Will continue current management plan and follow-up with workup results
Progress Note( Dr. Elke Dumont)  12/27/2023  Subjective:   Admit Date: 12/24/2023  PCP: No primary care provider on file. Admitted For :Altered mental state in patient with cirrhosis of liver and severe hyperglycemia    Consulted For:  Better control of blood glucose     Interval History: The patient blood glucose are better but still seems confused and has very slurred speech    Denies any chest pains,   Denies SOB . Denies nausea or vomiting. No new bowel or bladder symptoms. Intake/Output Summary (Last 24 hours) at 12/27/2023 2056  Last data filed at 12/27/2023 0955  Gross per 24 hour   Intake 240 ml   Output --   Net 240 ml       DATA    CBC:   Recent Labs     12/25/23 0503   WBC 4.1   HGB 10.2*   PLT 60*    CMP:  Recent Labs     12/25/23 0503      K 3.8      CO2 22   BUN 18   CREATININE 1.1   CALCIUM 8.4   PROT 6.6   LABALBU 3.1*   BILITOT 2.1*   ALKPHOS 109   AST 35   ALT 21     Lipids:   Lab Results   Component Value Date/Time    CHOL 126 12/25/2023 05:03 AM    HDL 66 12/25/2023 05:03 AM    TRIG 82 12/25/2023 05:03 AM     Glucose:  Recent Labs     12/27/23  0647 12/27/23  1105 12/27/23  1554   POCGLU 256* 327* 336*     LihdoagmxbY2U:  Lab Results   Component Value Date/Time    LABA1C 6.1 12/25/2023 05:03 AM     High Sensitivity TSH:   Lab Results   Component Value Date/Time    TSHHS 3.100 12/25/2023 05:03 AM     Free T3: No results found for: \"FT3\"  Free T4:  Lab Results   Component Value Date/Time    T4FREE 1.10 12/25/2023 05:03 AM       CT ABDOMEN PELVIS W IV CONTRAST Additional Contrast? None   Final Result   1. Cirrhosis with portal venous hypertension. No focal hepatic lesion. 2. Cholelithiasis with no CT evidence of cholecystitis. 3. Although not well seen by CT, there is a suspected hydrocele on the right. 4. Bilateral retroareolar densities. Please correlate with clinical symptoms   of gynecomastia.          MRI BRAIN WO CONTRAST   Final Result   No acute cortical infarct
12/24/2023 08:15 PM    LEUKOCYTESUR NEGATIVE 12/24/2023 08:15 PM    UROBILINOGEN 1.0 12/24/2023 08:15 PM    BILIRUBINUR NEGATIVE 12/24/2023 08:15 PM    BLOODU MODERATE NUMBER OR AMOUNT OBSERVED 12/24/2023 08:15 PM    KETUA NEGATIVE 12/24/2023 08:15 PM     Urine Cultures: No results found for: \"LABURIN\"  Blood Cultures: No results found for: \"BC\"  No results found for: \"BLOODCULT2\"  Organism: No results found for: \"ORG\"      Electronically signed by José Mcgovern MD on 12/26/2023 at 8:36 AM

## 2023-12-29 LAB
ANA CYTOPLASMIC PATTERN: ABNORMAL
ANA CYTOPLASMIC TITER: ABNORMAL
ANA PATTERN: ABNORMAL
ANA TITER: ABNORMAL
ANTINUCLEAR ANTIBODY, HEP-2, IGG: DETECTED
INTERPRETATION: ABNORMAL